# Patient Record
Sex: FEMALE | Race: WHITE
[De-identification: names, ages, dates, MRNs, and addresses within clinical notes are randomized per-mention and may not be internally consistent; named-entity substitution may affect disease eponyms.]

---

## 2017-11-14 PROBLEM — Z00.00 ENCOUNTER FOR PREVENTIVE HEALTH EXAMINATION: Status: ACTIVE | Noted: 2017-11-14

## 2018-08-15 ENCOUNTER — APPOINTMENT (OUTPATIENT)
Dept: INTERNAL MEDICINE | Facility: CLINIC | Age: 48
End: 2018-08-15
Payer: COMMERCIAL

## 2018-08-15 VITALS
BODY MASS INDEX: 40.12 KG/M2 | WEIGHT: 218 LBS | TEMPERATURE: 97.4 F | SYSTOLIC BLOOD PRESSURE: 110 MMHG | HEIGHT: 62 IN | DIASTOLIC BLOOD PRESSURE: 70 MMHG

## 2018-08-15 DIAGNOSIS — N60.01 SOLITARY CYST OF RIGHT BREAST: ICD-10-CM

## 2018-08-15 DIAGNOSIS — Z82.0 FAMILY HISTORY OF EPILEPSY AND OTHER DISEASES OF THE NERVOUS SYSTEM: ICD-10-CM

## 2018-08-15 DIAGNOSIS — Z80.0 FAMILY HISTORY OF MALIGNANT NEOPLASM OF DIGESTIVE ORGANS: ICD-10-CM

## 2018-08-15 DIAGNOSIS — Z13.89 ENCOUNTER FOR SCREENING FOR OTHER DISORDER: ICD-10-CM

## 2018-08-15 DIAGNOSIS — E66.01 MORBID (SEVERE) OBESITY DUE TO EXCESS CALORIES: ICD-10-CM

## 2018-08-15 DIAGNOSIS — Z78.9 OTHER SPECIFIED HEALTH STATUS: ICD-10-CM

## 2018-08-15 DIAGNOSIS — G47.33 OBSTRUCTIVE SLEEP APNEA (ADULT) (PEDIATRIC): ICD-10-CM

## 2018-08-15 DIAGNOSIS — E28.2 POLYCYSTIC OVARIAN SYNDROME: ICD-10-CM

## 2018-08-15 LAB
BILIRUB UR QL STRIP: NORMAL
CLARITY UR: CLEAR
COLLECTION METHOD: NORMAL
GLUCOSE UR-MCNC: NORMAL
HCG UR QL: 0.2 EU/DL
HGB UR QL STRIP.AUTO: NORMAL
KETONES UR-MCNC: NORMAL
LEUKOCYTE ESTERASE UR QL STRIP: NORMAL
NITRITE UR QL STRIP: NORMAL
PH UR STRIP: 6
PROT UR STRIP-MCNC: NORMAL
SP GR UR STRIP: 1

## 2018-08-15 PROCEDURE — 99214 OFFICE O/P EST MOD 30 MIN: CPT | Mod: 25

## 2018-08-15 PROCEDURE — 36415 COLL VENOUS BLD VENIPUNCTURE: CPT

## 2018-08-15 PROCEDURE — 81003 URINALYSIS AUTO W/O SCOPE: CPT | Mod: QW

## 2018-08-15 RX ORDER — FLUTICASONE PROPIONATE 50 UG/1
50 SPRAY, METERED NASAL
Refills: 0 | Status: ACTIVE | COMMUNITY

## 2018-08-15 RX ORDER — BIOTIN 10 MG
TABLET ORAL
Refills: 0 | Status: ACTIVE | COMMUNITY

## 2018-08-15 RX ORDER — CINNAMON BARK 500 MG
CAPSULE ORAL
Refills: 0 | Status: ACTIVE | COMMUNITY

## 2018-08-15 NOTE — PHYSICAL EXAM
[No Acute Distress] : no acute distress [Well Nourished] : well nourished [Normal Sclera/Conjunctiva] : normal sclera/conjunctiva [Normal Outer Ear/Nose] : the outer ears and nose were normal in appearance [No Respiratory Distress] : no respiratory distress  [Clear to Auscultation] : lungs were clear to auscultation bilaterally [No Accessory Muscle Use] : no accessory muscle use [Normal Rate] : normal rate  [Regular Rhythm] : with a regular rhythm [Normal S1, S2] : normal S1 and S2 [Soft] : abdomen soft [Non Tender] : non-tender [Non-distended] : non-distended [Normal Bowel Sounds] : normal bowel sounds [Normal Affect] : the affect was normal [Normal Insight/Judgement] : insight and judgment were intact

## 2018-08-15 NOTE — REVIEW OF SYSTEMS
[Dysuria] : no dysuria [Incontinence] : no incontinence [Hematuria] : no hematuria [Frequency] : frequency [Negative] : Psychiatric

## 2018-08-15 NOTE — HISTORY OF PRESENT ILLNESS
[FreeTextEntry8] : 49 y/o female present with complaints of frequent urination. She has had these symptoms for several years. She can urinate 15 times for the day. 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 However it does affect her social life because she has to know where the bathroom is. No blood in her urine. No dysuria. She hasn't had a change in her weight. She can't say that she's drinking more than usual.

## 2018-08-21 LAB
ALBUMIN SERPL ELPH-MCNC: 4.6 G/DL
ALP BLD-CCNC: 55 U/L
ALT SERPL-CCNC: 21 U/L
ANION GAP SERPL CALC-SCNC: 12 MMOL/L
APPEARANCE: CLEAR
AST SERPL-CCNC: 19 U/L
BACTERIA: NEGATIVE
BILIRUB SERPL-MCNC: 0.5 MG/DL
BILIRUBIN URINE: NEGATIVE
BLOOD URINE: NEGATIVE
BUN SERPL-MCNC: 13 MG/DL
CALCIUM SERPL-MCNC: 9.9 MG/DL
CHLORIDE SERPL-SCNC: 100 MMOL/L
CO2 SERPL-SCNC: 26 MMOL/L
COLOR: YELLOW
CREAT SERPL-MCNC: 0.3 MG/DL
GLUCOSE QUALITATIVE U: NEGATIVE MG/DL
GLUCOSE SERPL-MCNC: 86 MG/DL
HBA1C MFR BLD HPLC: 5.5 %
HYALINE CASTS: 2 /LPF
KETONES URINE: NEGATIVE
LEUKOCYTE ESTERASE URINE: NEGATIVE
MICROSCOPIC-UA: NORMAL
NITRITE URINE: NEGATIVE
PH URINE: 6.5
POTASSIUM SERPL-SCNC: 4.4 MMOL/L
PROT SERPL-MCNC: 7.6 G/DL
PROTEIN URINE: NEGATIVE MG/DL
RED BLOOD CELLS URINE: 2 /HPF
SODIUM SERPL-SCNC: 138 MMOL/L
SPECIFIC GRAVITY URINE: 1.01
SQUAMOUS EPITHELIAL CELLS: 0 /HPF
UROBILINOGEN URINE: NEGATIVE MG/DL
WHITE BLOOD CELLS URINE: 0 /HPF

## 2018-08-22 ENCOUNTER — MESSAGE (OUTPATIENT)
Age: 48
End: 2018-08-22

## 2018-11-13 ENCOUNTER — APPOINTMENT (OUTPATIENT)
Dept: INTERNAL MEDICINE | Facility: CLINIC | Age: 48
End: 2018-11-13
Payer: COMMERCIAL

## 2018-11-13 VITALS
BODY MASS INDEX: 40.48 KG/M2 | SYSTOLIC BLOOD PRESSURE: 106 MMHG | TEMPERATURE: 97.2 F | WEIGHT: 220 LBS | DIASTOLIC BLOOD PRESSURE: 70 MMHG | HEIGHT: 62 IN

## 2018-11-13 DIAGNOSIS — J02.0 STREPTOCOCCAL PHARYNGITIS: ICD-10-CM

## 2018-11-13 LAB — S PYO AG SPEC QL IA: POSITIVE

## 2018-11-13 PROCEDURE — 99214 OFFICE O/P EST MOD 30 MIN: CPT | Mod: 25

## 2018-11-13 PROCEDURE — 87880 STREP A ASSAY W/OPTIC: CPT | Mod: QW

## 2018-11-13 NOTE — PHYSICAL EXAM
[No Acute Distress] : no acute distress [Well Nourished] : well nourished [Normal Sclera/Conjunctiva] : normal sclera/conjunctiva [PERRL] : pupils equal round and reactive to light [EOMI] : extraocular movements intact [No Respiratory Distress] : no respiratory distress  [Clear to Auscultation] : lungs were clear to auscultation bilaterally [No Accessory Muscle Use] : no accessory muscle use [Normal Rate] : normal rate  [Regular Rhythm] : with a regular rhythm [Normal S1, S2] : normal S1 and S2 [Normal Posterior Cervical Nodes] : no posterior cervical lymphadenopathy [Normal Anterior Cervical Nodes] : no anterior cervical lymphadenopathy [Normal Affect] : the affect was normal [Normal Insight/Judgement] : insight and judgment were intact [de-identified] : BL TM bulging   oropharynx erythema

## 2018-11-14 RX ORDER — NICOTINE 21 MG/24HR
14 PATCH, TRANSDERMAL 24 HOURS TRANSDERMAL DAILY
Qty: 30 | Refills: 2 | Status: ACTIVE | COMMUNITY
Start: 2018-11-13 | End: 1900-01-01

## 2018-12-04 ENCOUNTER — CLINICAL ADVICE (OUTPATIENT)
Age: 48
End: 2018-12-04

## 2019-04-04 ENCOUNTER — APPOINTMENT (OUTPATIENT)
Dept: INTERNAL MEDICINE | Facility: CLINIC | Age: 49
End: 2019-04-04
Payer: COMMERCIAL

## 2019-04-04 VITALS
BODY MASS INDEX: 41.41 KG/M2 | DIASTOLIC BLOOD PRESSURE: 70 MMHG | HEIGHT: 62 IN | WEIGHT: 225 LBS | TEMPERATURE: 97.9 F | SYSTOLIC BLOOD PRESSURE: 100 MMHG

## 2019-04-04 PROCEDURE — 99214 OFFICE O/P EST MOD 30 MIN: CPT

## 2019-04-04 NOTE — PHYSICAL EXAM
[No Acute Distress] : no acute distress [Well Nourished] : well nourished [Normal Sclera/Conjunctiva] : normal sclera/conjunctiva [PERRL] : pupils equal round and reactive to light [EOMI] : extraocular movements intact [Normal Outer Ear/Nose] : the outer ears and nose were normal in appearance [No JVD] : no jugular venous distention [Supple] : supple [No Lymphadenopathy] : no lymphadenopathy [No Respiratory Distress] : no respiratory distress  [Clear to Auscultation] : lungs were clear to auscultation bilaterally [No Accessory Muscle Use] : no accessory muscle use [Normal Rate] : normal rate  [Regular Rhythm] : with a regular rhythm [Normal S1, S2] : normal S1 and S2 [Normal Posterior Cervical Nodes] : no posterior cervical lymphadenopathy [Normal Anterior Cervical Nodes] : no anterior cervical lymphadenopathy [Normal Affect] : the affect was normal [Normal Insight/Judgement] : insight and judgment were intact [de-identified] : PND present.  Mild nasal mucosa erythema

## 2019-04-04 NOTE — HISTORY OF PRESENT ILLNESS
[FreeTextEntry8] : 47 y/o female present with a sinus infection. \par She had facial pain and pressure over the weekend\par Her Chiropractor gave her Medrol dose pack for her back pain and sinus congestion and she feels much better.  Her only complaitn now is mild congestion, PND and slight cough

## 2019-04-04 NOTE — PLAN
[FreeTextEntry1] : pt will start medications above and follow up in the next 3-4 days if not improvement or before then if worsens

## 2019-04-26 ENCOUNTER — APPOINTMENT (OUTPATIENT)
Dept: INTERNAL MEDICINE | Facility: CLINIC | Age: 49
End: 2019-04-26
Payer: COMMERCIAL

## 2019-04-26 VITALS
BODY MASS INDEX: 41.41 KG/M2 | HEIGHT: 62 IN | SYSTOLIC BLOOD PRESSURE: 110 MMHG | WEIGHT: 225 LBS | TEMPERATURE: 97 F | DIASTOLIC BLOOD PRESSURE: 80 MMHG

## 2019-04-26 DIAGNOSIS — Z87.09 PERSONAL HISTORY OF OTHER DISEASES OF THE RESPIRATORY SYSTEM: ICD-10-CM

## 2019-04-26 DIAGNOSIS — Z87.891 PERSONAL HISTORY OF NICOTINE DEPENDENCE: ICD-10-CM

## 2019-04-26 DIAGNOSIS — F17.200 NICOTINE DEPENDENCE, UNSPECIFIED, UNCOMPLICATED: ICD-10-CM

## 2019-04-26 DIAGNOSIS — Z86.69 PERSONAL HISTORY OF OTHER DISEASES OF THE NERVOUS SYSTEM AND SENSE ORGANS: ICD-10-CM

## 2019-04-26 LAB — S PYO AG SPEC QL IA: NEGATIVE

## 2019-04-26 PROCEDURE — 87880 STREP A ASSAY W/OPTIC: CPT | Mod: QW

## 2019-04-26 PROCEDURE — 99214 OFFICE O/P EST MOD 30 MIN: CPT | Mod: 25

## 2019-04-26 RX ORDER — METHYLPREDNISOLONE 4 MG/1
4 TABLET ORAL
Qty: 21 | Refills: 0 | Status: COMPLETED | COMMUNITY
Start: 2019-03-25

## 2019-04-26 RX ORDER — BROMPHENIRAMINE MALEATE, PSEUDOEPHEDRINE HYDROCHLORIDE AND DEXTROMETHORPHAN HYDROBROMIDE 2; 30; 10 MG/5ML; MG/5ML; MG/5ML
30-2-10 SYRUP ORAL
Qty: 200 | Refills: 0 | Status: COMPLETED | COMMUNITY
Start: 2019-04-04 | End: 2019-04-26

## 2019-04-26 RX ORDER — CEFDINIR 300 MG/1
300 CAPSULE ORAL
Qty: 20 | Refills: 0 | Status: COMPLETED | COMMUNITY
Start: 2018-11-13 | End: 2019-04-26

## 2019-04-26 RX ORDER — PREDNISONE 50 MG/1
50 TABLET ORAL
Qty: 5 | Refills: 0 | Status: COMPLETED | COMMUNITY
Start: 2019-03-25

## 2019-04-26 RX ORDER — PROMETHAZINE HYDROCHLORIDE AND CODEINE PHOSPHATE 6.25; 1 MG/5ML; MG/5ML
6.25-1 SOLUTION ORAL
Qty: 100 | Refills: 0 | Status: COMPLETED | COMMUNITY
Start: 2018-11-13 | End: 2019-04-26

## 2019-04-26 RX ORDER — METFORMIN HYDROCHLORIDE 500 MG/1
500 TABLET, COATED ORAL
Refills: 0 | Status: COMPLETED | COMMUNITY
End: 2019-04-26

## 2019-04-26 RX ORDER — PHENTERMINE AND TOPIRAMATE 7.5; 46 MG/1; MG/1
7.5-46 CAPSULE, EXTENDED RELEASE ORAL
Qty: 30 | Refills: 0 | Status: COMPLETED | COMMUNITY
Start: 2018-12-05

## 2019-04-26 RX ORDER — METFORMIN ER 500 MG 500 MG/1
500 TABLET ORAL
Qty: 180 | Refills: 0 | Status: ACTIVE | COMMUNITY
Start: 2019-04-04

## 2019-04-26 NOTE — REVIEW OF SYSTEMS
[Nasal Discharge] : nasal discharge [Earache] : earache [Sore Throat] : sore throat [Cough] : cough [Negative] : Cardiovascular

## 2019-04-26 NOTE — HISTORY OF PRESENT ILLNESS
[FreeTextEntry8] : 47 y/o female present with a sinus pressure, sore throat and right ear pressure that has been occurring for the last 2 weeks now getting worse.\par Thought it might have been allergies but now getting pain no improvement with cough medication and nasal sprays

## 2019-04-26 NOTE — PHYSICAL EXAM
[No Acute Distress] : no acute distress [Normal Sclera/Conjunctiva] : normal sclera/conjunctiva [PERRL] : pupils equal round and reactive to light [Well Nourished] : well nourished [Normal Outer Ear/Nose] : the outer ears and nose were normal in appearance [EOMI] : extraocular movements intact [No JVD] : no jugular venous distention [Supple] : supple [No Lymphadenopathy] : no lymphadenopathy [No Respiratory Distress] : no respiratory distress  [Clear to Auscultation] : lungs were clear to auscultation bilaterally [Normal Rate] : normal rate  [Regular Rhythm] : with a regular rhythm [No Accessory Muscle Use] : no accessory muscle use [Normal S1, S2] : normal S1 and S2 [Normal Posterior Cervical Nodes] : no posterior cervical lymphadenopathy [Normal Anterior Cervical Nodes] : no anterior cervical lymphadenopathy [Normal Affect] : the affect was normal [Normal Insight/Judgement] : insight and judgment were intact [de-identified] : Mild oropharynx erythema.  TM intact BL right TM erythema, nasal mucosa erythema and swelling

## 2019-04-26 NOTE — PLAN
[FreeTextEntry1] : Pt will start medications above and follow up in 5-7 days if not feeling better or before then if worsening symptoms\par

## 2019-05-07 ENCOUNTER — APPOINTMENT (OUTPATIENT)
Dept: INTERNAL MEDICINE | Facility: CLINIC | Age: 49
End: 2019-05-07
Payer: COMMERCIAL

## 2019-05-07 VITALS
HEIGHT: 62 IN | DIASTOLIC BLOOD PRESSURE: 80 MMHG | TEMPERATURE: 97.7 F | BODY MASS INDEX: 41.41 KG/M2 | SYSTOLIC BLOOD PRESSURE: 120 MMHG | WEIGHT: 225 LBS

## 2019-05-07 DIAGNOSIS — Z86.19 PERSONAL HISTORY OF OTHER INFECTIOUS AND PARASITIC DISEASES: ICD-10-CM

## 2019-05-07 LAB
BILIRUB UR QL STRIP: NEGATIVE
CLARITY UR: CLEAR
COLLECTION METHOD: NORMAL
GLUCOSE UR-MCNC: NEGATIVE
HCG UR QL: NORMAL EU/DL
HGB UR QL STRIP.AUTO: NEGATIVE
KETONES UR-MCNC: NEGATIVE
LEUKOCYTE ESTERASE UR QL STRIP: NORMAL
NITRITE UR QL STRIP: NEGATIVE
PH UR STRIP: 7
PROT UR STRIP-MCNC: NEGATIVE
SP GR UR STRIP: 1.02

## 2019-05-07 PROCEDURE — 36415 COLL VENOUS BLD VENIPUNCTURE: CPT

## 2019-05-07 PROCEDURE — 81003 URINALYSIS AUTO W/O SCOPE: CPT | Mod: QW

## 2019-05-07 PROCEDURE — 99214 OFFICE O/P EST MOD 30 MIN: CPT | Mod: 25

## 2019-05-07 NOTE — PHYSICAL EXAM
[Normal Sclera/Conjunctiva] : normal sclera/conjunctiva [No Acute Distress] : no acute distress [EOMI] : extraocular movements intact [PERRL] : pupils equal round and reactive to light [Normal Outer Ear/Nose] : the outer ears and nose were normal in appearance [Normal TMs] : both tympanic membranes were normal [Normal Oropharynx] : the oropharynx was normal [Supple] : supple [No JVD] : no jugular venous distention [Clear to Auscultation] : lungs were clear to auscultation bilaterally [No Lymphadenopathy] : no lymphadenopathy [No Respiratory Distress] : no respiratory distress  [Normal Rate] : normal rate  [No Accessory Muscle Use] : no accessory muscle use [Regular Rhythm] : with a regular rhythm [Normal S1, S2] : normal S1 and S2 [Normal Affect] : the affect was normal [de-identified] : TMJ tenderness with Palpation BL  [Normal Insight/Judgement] : insight and judgment were intact

## 2019-05-07 NOTE — PLAN
[FreeTextEntry1] : Pt appears to have secondary vaginal candidiasis from abx use  she will take Diflucan above for single dose.  urine negative for UTI and no flank pain, fever or urinary symptoms\par She will start Zanaflex for TMJ at bedtime and follow up with specialist as planned\par She will start Protonix for her nausea and reflux for the next 7 to 14 days pending on how she is feeling\par She needs no further abx treatment as her OM has resolved. \par Follow up in 1 week if not feeling better or before then if worsens\par BW will be done as well today for further evaluation

## 2019-05-07 NOTE — HISTORY OF PRESENT ILLNESS
[FreeTextEntry8] : 47 y/o female present with an ear infection, bathroom problems, and yeast infection. \par Pt presents to the office today for follow up.  She has been getting nausea and bloating since taking Augmentin.   She only completed 7 days of treatment and had to stop medication.\par She has been getting vaginal itch and D/C no flank pain no dysuria\par Ear pain has subsided but has been with exacerbation of her TMJ and is currently waiting on appointment with specialist.

## 2019-05-07 NOTE — REVIEW OF SYSTEMS
[Fatigue] : fatigue [Abdominal Pain] : no abdominal pain [Nausea] : nausea [Constipation] : no constipation [Diarrhea] : diarrhea [Vomiting] : no vomiting [Heartburn] : heartburn [Nocturia] : no nocturia [Dysuria] : no dysuria [Incontinence] : no incontinence [Hematuria] : no hematuria [Frequency] : no frequency [Vaginal Discharge] : vaginal discharge [Negative] : Respiratory [FreeTextEntry7] : Bloating  [FreeTextEntry4] : ear pressure TMJ tenderness

## 2019-05-09 LAB
ALBUMIN SERPL ELPH-MCNC: 4.5 G/DL
ALP BLD-CCNC: 54 U/L
ALT SERPL-CCNC: 23 U/L
ANION GAP SERPL CALC-SCNC: 16 MMOL/L
AST SERPL-CCNC: 18 U/L
BASOPHILS # BLD AUTO: 0.04 K/UL
BASOPHILS NFR BLD AUTO: 0.4 %
BILIRUB SERPL-MCNC: 0.4 MG/DL
BUN SERPL-MCNC: 13 MG/DL
CALCIUM SERPL-MCNC: 10 MG/DL
CHLORIDE SERPL-SCNC: 102 MMOL/L
CO2 SERPL-SCNC: 24 MMOL/L
CREAT SERPL-MCNC: 0.76 MG/DL
EOSINOPHIL # BLD AUTO: 0.08 K/UL
EOSINOPHIL NFR BLD AUTO: 0.9 %
GLUCOSE SERPL-MCNC: 94 MG/DL
HCT VFR BLD CALC: 41.7 %
HGB BLD-MCNC: 13.5 G/DL
IMM GRANULOCYTES NFR BLD AUTO: 0.4 %
LYMPHOCYTES # BLD AUTO: 2.84 K/UL
LYMPHOCYTES NFR BLD AUTO: 30.8 %
MAN DIFF?: NORMAL
MCHC RBC-ENTMCNC: 31.3 PG
MCHC RBC-ENTMCNC: 32.4 GM/DL
MCV RBC AUTO: 96.8 FL
MONOCYTES # BLD AUTO: 0.59 K/UL
MONOCYTES NFR BLD AUTO: 6.4 %
NEUTROPHILS # BLD AUTO: 5.63 K/UL
NEUTROPHILS NFR BLD AUTO: 61.1 %
PLATELET # BLD AUTO: 348 K/UL
POTASSIUM SERPL-SCNC: 4.1 MMOL/L
PROT SERPL-MCNC: 7 G/DL
RBC # BLD: 4.31 M/UL
RBC # FLD: 12.5 %
SODIUM SERPL-SCNC: 142 MMOL/L
WBC # FLD AUTO: 9.22 K/UL

## 2019-05-10 ENCOUNTER — RESULT REVIEW (OUTPATIENT)
Age: 49
End: 2019-05-10

## 2020-12-01 ENCOUNTER — APPOINTMENT (OUTPATIENT)
Dept: INTERNAL MEDICINE | Facility: CLINIC | Age: 50
End: 2020-12-01
Payer: COMMERCIAL

## 2020-12-01 VITALS
HEART RATE: 82 BPM | TEMPERATURE: 98.8 F | OXYGEN SATURATION: 99 % | DIASTOLIC BLOOD PRESSURE: 82 MMHG | RESPIRATION RATE: 16 BRPM | SYSTOLIC BLOOD PRESSURE: 122 MMHG

## 2020-12-01 DIAGNOSIS — R22.41 LOCALIZED SWELLING, MASS AND LUMP, RIGHT LOWER LIMB: ICD-10-CM

## 2020-12-01 PROCEDURE — 99214 OFFICE O/P EST MOD 30 MIN: CPT

## 2020-12-01 PROCEDURE — 99072 ADDL SUPL MATRL&STAF TM PHE: CPT

## 2020-12-01 RX ORDER — AMOXICILLIN AND CLAVULANATE POTASSIUM 875; 125 MG/1; MG/1
875-125 TABLET, COATED ORAL
Qty: 20 | Refills: 0 | Status: COMPLETED | COMMUNITY
Start: 2019-04-26 | End: 2020-12-01

## 2020-12-01 NOTE — REVIEW OF SYSTEMS
[FreeTextEntry7] : Loose stool the last few days [FreeTextEntry9] : Mild right calf tenderness lateral aspect no masses.  Lump behind right knee

## 2020-12-01 NOTE — PHYSICAL EXAM
[de-identified] : Varicose veins present.  No LE Edema BL    negative Homans' sign.  Right lateral calf is with palpation no masses [de-identified] : Right posterior knee small boggy mass palpable crease warmth

## 2020-12-01 NOTE — PLAN
[FreeTextEntry1] : Patient has mild intermittent loose stool for the last couple of days patient contributes eating leftovers for Thanksgiving\par Patient has no other Covid-like symptoms and refused Covid nasal swab\par Patient will go to radiology for x-ray of right knee and venous Doppler of right lower extremity for further evaluation

## 2020-12-01 NOTE — HISTORY OF PRESENT ILLNESS
[FreeTextEntry8] : Patient presents the office today with right posterior knee pain and lump for the last week.  She also states that she has mild calf pain.  She has had loose stool for the last few days.  Patient denies cough shortness of breath chest congestion no fevers

## 2020-12-02 ENCOUNTER — NON-APPOINTMENT (OUTPATIENT)
Age: 50
End: 2020-12-02

## 2020-12-08 ENCOUNTER — NON-APPOINTMENT (OUTPATIENT)
Age: 50
End: 2020-12-08

## 2020-12-09 ENCOUNTER — NON-APPOINTMENT (OUTPATIENT)
Age: 50
End: 2020-12-09

## 2020-12-16 PROBLEM — J02.0 PHARYNGITIS DUE TO GROUP A BETA HEMOLYTIC STREPTOCOCCI: Status: RESOLVED | Noted: 2018-11-13 | Resolved: 2020-12-16

## 2020-12-21 PROBLEM — Z86.69 HISTORY OF ACUTE OTITIS MEDIA: Status: RESOLVED | Noted: 2019-04-26 | Resolved: 2020-12-21

## 2020-12-21 PROBLEM — Z86.19 HISTORY OF CANDIDIASIS OF VAGINA: Status: RESOLVED | Noted: 2019-05-07 | Resolved: 2020-12-21

## 2020-12-21 PROBLEM — Z87.09 HISTORY OF SORE THROAT: Status: RESOLVED | Noted: 2019-04-26 | Resolved: 2020-12-21

## 2021-04-08 ENCOUNTER — APPOINTMENT (OUTPATIENT)
Dept: INTERNAL MEDICINE | Facility: CLINIC | Age: 51
End: 2021-04-08
Payer: COMMERCIAL

## 2021-04-08 VITALS
SYSTOLIC BLOOD PRESSURE: 130 MMHG | HEIGHT: 62 IN | TEMPERATURE: 96.9 F | WEIGHT: 220 LBS | DIASTOLIC BLOOD PRESSURE: 90 MMHG | BODY MASS INDEX: 40.48 KG/M2

## 2021-04-08 PROCEDURE — 99072 ADDL SUPL MATRL&STAF TM PHE: CPT

## 2021-04-08 PROCEDURE — 99214 OFFICE O/P EST MOD 30 MIN: CPT

## 2021-04-08 NOTE — HISTORY OF PRESENT ILLNESS
[de-identified] : Patient presents the office today for follow-up from her orthopedic doctor\par Patient went for MRI of her lumbar spine but has not reviewed the results with Dr. Pitts yet.  She has been suffering with right knee pain and lower back pain has been going to PT she has taken cyclobenzaprine and recently finished Medrol Dosepak.  Pain is still radiating down her right leg

## 2021-04-08 NOTE — PLAN
[FreeTextEntry1] : As above she needs to follow-up with orthopedic for review of MRI results\par However she should be making an appointment with pain management for possible epidural injection

## 2021-04-08 NOTE — REVIEW OF SYSTEMS
[Negative] : Constitutional [FreeTextEntry9] : Lower back pain rating down the right leg and right knee pain

## 2021-04-22 ENCOUNTER — TRANSCRIPTION ENCOUNTER (OUTPATIENT)
Age: 51
End: 2021-04-22

## 2021-04-22 ENCOUNTER — APPOINTMENT (OUTPATIENT)
Dept: DISASTER EMERGENCY | Facility: CLINIC | Age: 51
End: 2021-04-22

## 2021-04-23 LAB — SARS-COV-2 N GENE NPH QL NAA+PROBE: NOT DETECTED

## 2021-07-20 ENCOUNTER — APPOINTMENT (OUTPATIENT)
Dept: INTERNAL MEDICINE | Facility: CLINIC | Age: 51
End: 2021-07-20

## 2021-08-20 ENCOUNTER — TRANSCRIPTION ENCOUNTER (OUTPATIENT)
Age: 51
End: 2021-08-20

## 2022-04-27 ENCOUNTER — APPOINTMENT (OUTPATIENT)
Dept: PAIN MANAGEMENT | Facility: CLINIC | Age: 52
End: 2022-04-27

## 2022-05-03 ENCOUNTER — APPOINTMENT (OUTPATIENT)
Dept: PAIN MANAGEMENT | Facility: CLINIC | Age: 52
End: 2022-05-03
Payer: COMMERCIAL

## 2022-05-03 VITALS — HEIGHT: 62 IN | BODY MASS INDEX: 40.48 KG/M2 | WEIGHT: 220 LBS

## 2022-05-03 PROCEDURE — 99214 OFFICE O/P EST MOD 30 MIN: CPT

## 2022-05-03 RX ORDER — PREGABALIN 100 MG/1
100 CAPSULE ORAL
Refills: 0 | Status: ACTIVE | COMMUNITY

## 2022-05-03 RX ORDER — CYCLOBENZAPRINE HYDROCHLORIDE 5 MG/1
5 TABLET, FILM COATED ORAL
Refills: 0 | Status: ACTIVE | COMMUNITY

## 2022-05-03 RX ORDER — PREDNISONE 10 MG/1
10 TABLET ORAL
Refills: 0 | Status: ACTIVE | COMMUNITY

## 2022-05-03 RX ORDER — DICLOFENAC SODIUM 75 MG/1
75 TABLET, DELAYED RELEASE ORAL
Refills: 0 | Status: ACTIVE | COMMUNITY

## 2022-05-03 RX ORDER — HYDROCODONE BITARTRATE AND ACETAMINOPHEN 5; 300 MG/1; MG/1
5-300 TABLET ORAL
Refills: 0 | Status: ACTIVE | COMMUNITY

## 2022-05-03 RX ORDER — IBUPROFEN 800 MG/1
800 TABLET, FILM COATED ORAL
Refills: 0 | Status: ACTIVE | COMMUNITY

## 2022-05-03 RX ORDER — METFORMIN HYDROCHLORIDE 500 MG/1
500 TABLET, COATED ORAL
Refills: 0 | Status: ACTIVE | COMMUNITY

## 2022-05-03 RX ORDER — PREGABALIN 50 MG/1
50 CAPSULE ORAL
Refills: 0 | Status: ACTIVE | COMMUNITY

## 2022-05-03 NOTE — ASSESSMENT
[FreeTextEntry1] : After discussing various treatment options with the patient including but not limited to oral medications, physical therapy, exercise, modalities as well as interventional spinal injections, we have decided with the following plan:\par \par I personally reviewed the MRI/CT scan images and agree with the radiologist's report. The radiological findings were discussed with the patient.\par \par The risks, benefits, contents and alternatives to injection were explained in full to the patient. Risks outlined include but are not limited to infection,sepsis, bleeding, post-dural puncture headache, nerve damage, temporary increase in pain, syncopal episode, failure to resolve symptoms, allergic reaction, symptom recurrence, and elevation of blood sugar in diabetics. Cortisone may cause immunosuppression. Patient understands the risks. All questions were answered. After discussion of options, patient requested an injection. Information regarding the injection was given to the patient. Which medications to stop prior to the injection was explained to the patient as well.\par \par Follow up in 1-2 weeks post injection for re-evaluation. \par \par Continue Home exercises, stretching, activity modification, physical therapy, and conservative care.\par \par Patient is presenting with acute/sub-acute radicular pain with impairment in ADLs and functionality. The pain has not responded to conservative care including nsaid therapy and/or physical therapy. There is no bleeding tendency, unstable medical condition, or systemic infection.\par \par \par right L4/5 L5/S1 TFESI\par \par refill Lyrica I would recommend  continue of neuropathic medication as patient presents with signs of nerve irritation. (ie burning, paresthesias etc) Goals of therapy would be to improve pain and overall QOL. Side effects reviewed with patient. Patient will call or stop medication if given side effects occur.\par \par

## 2022-05-03 NOTE — HISTORY OF PRESENT ILLNESS
[Lower back] : lower back [1] : 2 [6] : 6 [Dull/Aching] : dull/aching [Throbbing] : throbbing [] : yes [Constant] : constant [Meds] : meds [Walking/activity] : walking/activity [Standing] : standing [FreeTextEntry1] : 05/03/2022: follow up today. taking Lyrica 100mg BID. She reports its helping, however she is gaining wait.  she was qsyemia previously, which she should look back into as it does contain Topamax ER.  pain in the hamstrings. \par \par 2/21/22: follow up today after right L4/5 TFESI on 1/31/22. she reports pain relief following. reports some weight gain relief the lyrica. Feels better when she works. Pain over the right lower back. she is taking 100mg BID\par \par 12/22/21- patient here for follow up. pain is returning in right leg and into right foot. Feels well on Lyrica. add 50mg daily. \par \par 10/19/21: follow up today. she reports overall she is doing well with the medication. She reports with prolonged lying down or sitting she gets pain down the right leg. Ibuprofen is helping. she forgot to take lyrica one night and felt worse. \par \par 8/30/21: follow up today. since last visit she did not start Cymbalta, instead Lyrica was called in at the patients request. She started taking 50mg BID and it helped, pain increased and she started taking it TID. \par \par 7/14/21: follow up today after right L4/5 TFESI on 6/17/21. Had 75% relief from MICHAEL. Will start Cymbalta. \par \par 6/15/21: follow up today after right L4/5 TESI on 5/20/21. Pain starts in the right buttock and radiates to the right calf and ankle. (very tearful today) she went for a follow up for her right knee (Dr. Pitts). She went to [facility] and was told it was coming from her back. Saw Dr. Levin and was told she needed surgery. \par \par 5/12/21- Patient had 60% relief from TFESI. Will repeat for cumulative effect.\par \par 4/14/21: Patient presents for initial evaluation. She complains of back and right leg pain. she started with right knee pain in January and had visco injections. she started PT and on the third session her pain started in the back and down the right leg. Pain radiates to the right lateral leg. Pain in the right calf. Intermittent n/t. tough to comfortable. +weakness. \par \par Subjective weakness: Yes\par Lower extremity paresthesias: Yes\par Bladder/bowel dysfunction: No\par \par Attempted modalities for current complaint:\par See above:\par \par Medications: Yes\par NSAIDS PRN\par \par Injections: (4/27/21, 5/20/21, 6/17/21, 1/31/22) - right L4-L5 TFESI\par \par Previous Spine Surgery: N/A\par \par Imaging:\par MRI Lumbar Spine (4/3/21) - Impression:\par 1. Slight exaggerated lumbar lordosis with anterolisthesis at L4-L5 where there is encroachment upon the right exiting L4 nerve root and moderate right foraminal narrowing with a small right paracentral protrusion encroaching upon the right S1 nerve root at L5-S1.\par 2. No acute fracture or pars defect. [FreeTextEntry6] : pressure [FreeTextEntry7] : right leg

## 2022-05-03 NOTE — PHYSICAL EXAM
[] : no palpable masses [TWNoteComboBox7] : forward flexion 75 degrees [de-identified] : extension 20 degrees

## 2022-06-21 ENCOUNTER — TRANSCRIPTION ENCOUNTER (OUTPATIENT)
Age: 52
End: 2022-06-21

## 2022-06-29 ENCOUNTER — APPOINTMENT (OUTPATIENT)
Dept: PAIN MANAGEMENT | Facility: CLINIC | Age: 52
End: 2022-06-29

## 2022-07-11 ENCOUNTER — NON-APPOINTMENT (OUTPATIENT)
Age: 52
End: 2022-07-11

## 2022-07-13 ENCOUNTER — APPOINTMENT (OUTPATIENT)
Dept: PAIN MANAGEMENT | Facility: CLINIC | Age: 52
End: 2022-07-13

## 2022-08-24 ENCOUNTER — APPOINTMENT (OUTPATIENT)
Dept: PAIN MANAGEMENT | Facility: CLINIC | Age: 52
End: 2022-08-24

## 2022-08-24 VITALS — BODY MASS INDEX: 41.59 KG/M2 | WEIGHT: 226 LBS | HEIGHT: 62 IN

## 2022-08-24 PROCEDURE — 99213 OFFICE O/P EST LOW 20 MIN: CPT

## 2022-08-24 NOTE — PHYSICAL EXAM
[] : no palpable masses [TWNoteComboBox7] : forward flexion 75 degrees [de-identified] : extension 20 degrees

## 2022-08-24 NOTE — HISTORY OF PRESENT ILLNESS
[Lower back] : lower back [1] : 2 [6] : 6 [Dull/Aching] : dull/aching [Throbbing] : throbbing [] : yes [Constant] : constant [Meds] : meds [Walking/activity] : walking/activity [Standing] : standing [FreeTextEntry1] : 08/24/2022: follow up today.  Will schedule repeat TFESI.  Feels well on Lyrica.  \par \par 05/03/2022: follow up today. taking Lyrica 100mg BID. She reports its helping, however she is gaining wait.  she was qsyemia previously, which she should look back into as it does contain Topamax ER.  pain in the hamstrings. \par \par 2/21/22: follow up today after right L4/5 TFESI on 1/31/22. she reports pain relief following. reports some weight gain relief the lyrica. Feels better when she works. Pain over the right lower back. she is taking 100mg BID\par \par 12/22/21- patient here for follow up. pain is returning in right leg and into right foot. Feels well on Lyrica. add 50mg daily. \par \par 10/19/21: follow up today. she reports overall she is doing well with the medication. She reports with prolonged lying down or sitting she gets pain down the right leg. Ibuprofen is helping. she forgot to take lyrica one night and felt worse. \par \par 8/30/21: follow up today. since last visit she did not start Cymbalta, instead Lyrica was called in at the patients request. She started taking 50mg BID and it helped, pain increased and she started taking it TID. \par \par 7/14/21: follow up today after right L4/5 TFESI on 6/17/21. Had 75% relief from MICHAEL. Will start Cymbalta. \par \par 6/15/21: follow up today after right L4/5 TESI on 5/20/21. Pain starts in the right buttock and radiates to the right calf and ankle. (very tearful today) she went for a follow up for her right knee (Dr. Pitts). She went to [facility] and was told it was coming from her back. Saw Dr. Levin and was told she needed surgery. \par \par 5/12/21- Patient had 60% relief from TFESI. Will repeat for cumulative effect.\par \par 4/14/21: Patient presents for initial evaluation. She complains of back and right leg pain. she started with right knee pain in January and had visco injections. she started PT and on the third session her pain started in the back and down the right leg. Pain radiates to the right lateral leg. Pain in the right calf. Intermittent n/t. tough to comfortable. +weakness. \par \par Subjective weakness: Yes\par Lower extremity paresthesias: Yes\par Bladder/bowel dysfunction: No\par \par Attempted modalities for current complaint:\par See above:\par \par Medications: Yes\par NSAIDS PRN\par \par Injections: (4/27/21, 5/20/21, 6/17/21, 1/31/22) - right L4-L5 TFESI\par \par Previous Spine Surgery: N/A\par \par Imaging:\par MRI Lumbar Spine (4/3/21) - Impression:\par 1. Slight exaggerated lumbar lordosis with anterolisthesis at L4-L5 where there is encroachment upon the right exiting L4 nerve root and moderate right foraminal narrowing with a small right paracentral protrusion encroaching upon the right S1 nerve root at L5-S1.\par 2. No acute fracture or pars defect. [FreeTextEntry6] : pressure [FreeTextEntry7] : right leg

## 2022-09-22 ENCOUNTER — APPOINTMENT (OUTPATIENT)
Age: 52
End: 2022-09-22

## 2022-11-10 ENCOUNTER — APPOINTMENT (OUTPATIENT)
Age: 52
End: 2022-11-10

## 2022-11-30 ENCOUNTER — APPOINTMENT (OUTPATIENT)
Dept: PAIN MANAGEMENT | Facility: CLINIC | Age: 52
End: 2022-11-30

## 2023-01-30 ENCOUNTER — APPOINTMENT (OUTPATIENT)
Dept: INTERNAL MEDICINE | Facility: CLINIC | Age: 53
End: 2023-01-30
Payer: COMMERCIAL

## 2023-01-30 VITALS
WEIGHT: 220 LBS | TEMPERATURE: 99.2 F | SYSTOLIC BLOOD PRESSURE: 118 MMHG | DIASTOLIC BLOOD PRESSURE: 80 MMHG | HEART RATE: 70 BPM | OXYGEN SATURATION: 98 % | HEIGHT: 62 IN | BODY MASS INDEX: 40.48 KG/M2

## 2023-01-30 DIAGNOSIS — J02.9 ACUTE PHARYNGITIS, UNSPECIFIED: ICD-10-CM

## 2023-01-30 LAB — S PYO AG SPEC QL IA: NEGATIVE

## 2023-01-30 PROCEDURE — 99213 OFFICE O/P EST LOW 20 MIN: CPT | Mod: 25

## 2023-01-30 PROCEDURE — 87880 STREP A ASSAY W/OPTIC: CPT | Mod: QW

## 2023-01-30 NOTE — HISTORY OF PRESENT ILLNESS
[FreeTextEntry8] : 53 y/o female presents to the office today with swollen glands, sore throat and congestion \par Pt states she found Amoxicillin in her house and has been taking it \par No fevers no chills no body aches

## 2023-01-30 NOTE — PLAN
[FreeTextEntry1] : Rapid strep test negative\par Viral PCR done in office today as patient has checked her self for COVID daily since Saturday and has been negative\par Patient is already started herself on amoxicillin however do not recommend patient using as this appears to be viral infection at this time\par Patient will be advised of PCR results\par Patient will call with any new or worsening symptoms\par Recommend over-the-counter nasal decongestant and Flonase

## 2023-01-30 NOTE — PHYSICAL EXAM
[Normal Outer Ear/Nose] : the outer ears and nose were normal in appearance [Normal Oropharynx] : the oropharynx was normal [Normal TMs] : both tympanic membranes were normal [Normal] : affect was normal and insight and judgment were intact [de-identified] : Mild nasal mucosa erythema

## 2023-02-02 ENCOUNTER — TRANSCRIPTION ENCOUNTER (OUTPATIENT)
Age: 53
End: 2023-02-02

## 2023-02-02 LAB
RAPID RVP RESULT: DETECTED
RV+EV RNA SPEC QL NAA+PROBE: DETECTED
SARS-COV-2 RNA PNL RESP NAA+PROBE: NOT DETECTED

## 2023-08-03 ENCOUNTER — APPOINTMENT (OUTPATIENT)
Dept: INTERNAL MEDICINE | Facility: CLINIC | Age: 53
End: 2023-08-03

## 2023-09-15 ENCOUNTER — APPOINTMENT (OUTPATIENT)
Dept: PAIN MANAGEMENT | Facility: CLINIC | Age: 53
End: 2023-09-15
Payer: COMMERCIAL

## 2023-09-15 VITALS — WEIGHT: 221 LBS | HEIGHT: 62 IN | BODY MASS INDEX: 40.67 KG/M2

## 2023-09-15 PROCEDURE — 99213 OFFICE O/P EST LOW 20 MIN: CPT

## 2023-09-27 ENCOUNTER — APPOINTMENT (OUTPATIENT)
Dept: PAIN MANAGEMENT | Facility: CLINIC | Age: 53
End: 2023-09-27

## 2023-10-03 ENCOUNTER — APPOINTMENT (OUTPATIENT)
Dept: PAIN MANAGEMENT | Facility: CLINIC | Age: 53
End: 2023-10-03

## 2023-12-12 ENCOUNTER — APPOINTMENT (OUTPATIENT)
Dept: PAIN MANAGEMENT | Facility: CLINIC | Age: 53
End: 2023-12-12

## 2024-01-03 ENCOUNTER — APPOINTMENT (OUTPATIENT)
Dept: PAIN MANAGEMENT | Facility: CLINIC | Age: 54
End: 2024-01-03
Payer: COMMERCIAL

## 2024-01-03 VITALS — HEIGHT: 62 IN | WEIGHT: 212 LBS | BODY MASS INDEX: 39.01 KG/M2

## 2024-01-03 DIAGNOSIS — M51.9 UNSPECIFIED THORACIC, THORACOLUMBAR AND LUMBOSACRAL INTERVERTEBRAL DISC DISORDER: ICD-10-CM

## 2024-01-03 PROCEDURE — 99213 OFFICE O/P EST LOW 20 MIN: CPT

## 2024-01-03 RX ORDER — PREGABALIN 50 MG/1
50 CAPSULE ORAL
Qty: 60 | Refills: 2 | Status: ACTIVE | COMMUNITY
Start: 2022-05-03 | End: 1900-01-01

## 2024-01-03 NOTE — ASSESSMENT
[FreeTextEntry1] : After discussing various treatment options with the patient including but not limited to oral medications, physical therapy, exercise, modalities as well as interventional spinal injections, we have decided with the following plan:  1) Intervention Injection Therapy: I personally reviewed the MRI/CT scan images and agree with the radiologist's report. The radiological findings were discussed with the patient. The risks, benefits, contents and alternatives to injection were explained in full to the patient. Risks outlined include but are not limited to infection,sepsis, bleeding, post-dural puncture headache, nerve damage, temporary increase in pain, syncopal episode, failure to resolve symptoms, allergic reaction, symptom recurrence, and elevation of blood sugar in diabetics. Cortisone may cause immunosuppression. Patient understands the risks. All questions were answered. After discussion of options, patient requested an injection. Information regarding the injection was given to the patient. Which medications to stop prior to the injection was explained to the patient as well.  Follow up in 1-2 weeks post injection for re-evaluation.  Continue Home exercises, stretching, activity modification, physical therapy, and conservative care.  Patient is presenting with acute/sub-acute radicular pain with impairment in ADLs and functionality.  The pain has not responded sufficiently to  conservative care including nsaid therapy and/or physical therapy.  There is no bleeding tendency, unstable medical condition, or systemic infection. The purpose of the spinal injections is to facilitate active therapy by providing short term relief through reduction of pain and inflammation.   Injections, by themselves, are not likely to provide long-term relief. Rather, active rehabilitation with modified work achieves long-term relief by increasing active ROM, strength and stability.

## 2024-01-03 NOTE — HISTORY OF PRESENT ILLNESS
[Lower back] : lower back [6] : 6 [Dull/Aching] : dull/aching [Throbbing] : throbbing [Constant] : constant [Meds] : meds [Walking/activity] : walking/activity [Standing] : standing [2] : 2 [FreeTextEntry1] : 01/03/2024: follow up today.   Feels well on medication.  Did a lot over [place] break.    09/15/2023: follow up today for Lyrica refill.  Feeling well on Lyrica and will refill 50mg.    08/24/2022: follow up today.  Will schedule repeat TFESI.  Feels well on Lyrica.    05/03/2022: follow up today. taking Lyrica 100mg BID. She reports its helping, however she is gaining wait.  she was qsyemia previously, which she should look back into as it does contain Topamax ER.  pain in the hamstrings.   2/21/22: follow up today after right L4/5 TFESI on 1/31/22. she reports pain relief following. reports some weight gain relief the lyrica. Feels better when she works. Pain over the right lower back. she is taking 100mg BID  12/22/21- patient here for follow up. pain is returning in right leg and into right foot. Feels well on Lyrica. add 50mg daily.   10/19/21: follow up today. she reports overall she is doing well with the medication. She reports with prolonged lying down or sitting she gets pain down the right leg. Ibuprofen is helping. she forgot to take lyrica one night and felt worse.   8/30/21: follow up today. since last visit she did not start Cymbalta, instead Lyrica was called in at the patients request. She started taking 50mg BID and it helped, pain increased and she started taking it TID.   7/14/21: follow up today after right L4/5 TFESI on 6/17/21. Had 75% relief from MICHAEL. Will start Cymbalta.   6/15/21: follow up today after right L4/5 TESI on 5/20/21. Pain starts in the right buttock and radiates to the right calf and ankle. (very tearful today) she went for a follow up for her right knee (Dr. Pitts). She went to [facility] and was told it was coming from her back. Saw Dr. Levin and was told she needed surgery.   5/12/21- Patient had 60% relief from TFESI. Will repeat for cumulative effect.  4/14/21: Patient presents for initial evaluation. She complains of back and right leg pain. she started with right knee pain in January and had visco injections. she started PT and on the third session her pain started in the back and down the right leg. Pain radiates to the right lateral leg. Pain in the right calf. Intermittent n/t. tough to comfortable. +weakness.   Subjective weakness: Yes Lower extremity paresthesias: Yes Bladder/bowel dysfunction: No  Attempted modalities for current complaint: See above:  Medications: Yes NSAIDS PRN  Injections: (4/27/21, 5/20/21, 6/17/21, 1/31/22) - right L4-L5 TFESI  Previous Spine Surgery: N/A  Imaging: MRI Lumbar Spine (4/3/21) - Impression: 1. Slight exaggerated lumbar lordosis with anterolisthesis at L4-L5 where there is encroachment upon the right exiting L4 nerve root and moderate right foraminal narrowing with a small right paracentral protrusion encroaching upon the right S1 nerve root at L5-S1. 2. No acute fracture or pars defect. [] : no [FreeTextEntry6] : pressure [FreeTextEntry7] : right leg  [de-identified] : L MRI

## 2024-01-03 NOTE — PHYSICAL EXAM
[] : no palpable masses [TWNoteComboBox7] : forward flexion 75 degrees [de-identified] : extension 20 degrees

## 2024-02-06 ENCOUNTER — APPOINTMENT (OUTPATIENT)
Dept: OTOLARYNGOLOGY | Facility: CLINIC | Age: 54
End: 2024-02-06

## 2024-02-14 ENCOUNTER — TRANSCRIPTION ENCOUNTER (OUTPATIENT)
Age: 54
End: 2024-02-14

## 2024-06-21 RX ORDER — IBUPROFEN 800 MG/1
800 TABLET, FILM COATED ORAL 3 TIMES DAILY
Qty: 90 | Refills: 0 | Status: ACTIVE | COMMUNITY
Start: 2022-05-03 | End: 1900-01-01

## 2024-06-24 ENCOUNTER — RX RENEWAL (OUTPATIENT)
Age: 54
End: 2024-06-24

## 2024-06-25 ENCOUNTER — APPOINTMENT (OUTPATIENT)
Dept: INTERNAL MEDICINE | Facility: CLINIC | Age: 54
End: 2024-06-25
Payer: COMMERCIAL

## 2024-06-25 VITALS
RESPIRATION RATE: 14 BRPM | HEART RATE: 85 BPM | DIASTOLIC BLOOD PRESSURE: 82 MMHG | BODY MASS INDEX: 38.28 KG/M2 | HEIGHT: 62 IN | WEIGHT: 208 LBS | TEMPERATURE: 98.4 F | OXYGEN SATURATION: 95 % | SYSTOLIC BLOOD PRESSURE: 110 MMHG

## 2024-06-25 DIAGNOSIS — M25.561 PAIN IN RIGHT KNEE: ICD-10-CM

## 2024-06-25 DIAGNOSIS — G89.29 LOW BACK PAIN, UNSPECIFIED: ICD-10-CM

## 2024-06-25 DIAGNOSIS — Z87.898 PERSONAL HISTORY OF OTHER SPECIFIED CONDITIONS: ICD-10-CM

## 2024-06-25 DIAGNOSIS — Z87.09 PERSONAL HISTORY OF OTHER DISEASES OF THE RESPIRATORY SYSTEM: ICD-10-CM

## 2024-06-25 DIAGNOSIS — J00 ACUTE NASOPHARYNGITIS [COMMON COLD]: ICD-10-CM

## 2024-06-25 DIAGNOSIS — Z48.02 ENCOUNTER FOR REMOVAL OF SUTURES: ICD-10-CM

## 2024-06-25 DIAGNOSIS — M54.50 LOW BACK PAIN, UNSPECIFIED: ICD-10-CM

## 2024-06-25 DIAGNOSIS — R39.9 UNSPECIFIED SYMPTOMS AND SIGNS INVOLVING THE GENITOURINARY SYSTEM: ICD-10-CM

## 2024-06-25 DIAGNOSIS — R09.89 OTHER SPECIFIED SYMPTOMS AND SIGNS INVOLVING THE CIRCULATORY AND RESPIRATORY SYSTEMS: ICD-10-CM

## 2024-06-25 DIAGNOSIS — Z01.818 ENCOUNTER FOR OTHER PREPROCEDURAL EXAMINATION: ICD-10-CM

## 2024-06-25 DIAGNOSIS — Z87.39 PERSONAL HISTORY OF OTHER DISEASES OF THE MUSCULOSKELETAL SYSTEM AND CONNECTIVE TISSUE: ICD-10-CM

## 2024-06-25 DIAGNOSIS — M62.830 MUSCLE SPASM OF BACK: ICD-10-CM

## 2024-06-25 PROCEDURE — 99213 OFFICE O/P EST LOW 20 MIN: CPT

## 2024-06-25 RX ORDER — PANTOPRAZOLE 40 MG/1
40 TABLET, DELAYED RELEASE ORAL
Qty: 14 | Refills: 0 | Status: COMPLETED | COMMUNITY
Start: 2019-05-07 | End: 2024-06-25

## 2024-06-25 RX ORDER — BROMPHENIRAMINE MALEATE, PSEUDOEPHEDRINE HYDROCHLORIDE AND DEXTROMETHORPHAN HYDROBROMIDE 2; 30; 10 MG/5ML; MG/5ML; MG/5ML
30-2-10 SYRUP ORAL
Qty: 200 | Refills: 0 | Status: COMPLETED | COMMUNITY
Start: 2019-04-26 | End: 2024-06-25

## 2024-06-25 RX ORDER — TIZANIDINE HYDROCHLORIDE 4 MG/1
4 CAPSULE ORAL
Qty: 20 | Refills: 0 | Status: COMPLETED | COMMUNITY
Start: 2019-05-07 | End: 2024-06-25

## 2024-06-25 RX ORDER — FLUCONAZOLE 200 MG/1
200 TABLET ORAL
Qty: 1 | Refills: 0 | Status: COMPLETED | COMMUNITY
Start: 2019-05-07 | End: 2024-06-25

## 2024-06-25 RX ORDER — MOMETASONE 50 UG/1
50 SPRAY, METERED NASAL
Qty: 1 | Refills: 0 | Status: COMPLETED | COMMUNITY
Start: 2019-04-04 | End: 2024-06-25

## 2024-07-09 ENCOUNTER — TRANSCRIPTION ENCOUNTER (OUTPATIENT)
Age: 54
End: 2024-07-09

## 2024-07-29 ENCOUNTER — APPOINTMENT (OUTPATIENT)
Dept: PAIN MANAGEMENT | Facility: CLINIC | Age: 54
End: 2024-07-29

## 2024-08-12 ENCOUNTER — APPOINTMENT (OUTPATIENT)
Dept: PAIN MANAGEMENT | Facility: CLINIC | Age: 54
End: 2024-08-12
Payer: COMMERCIAL

## 2024-08-12 ENCOUNTER — RX RENEWAL (OUTPATIENT)
Age: 54
End: 2024-08-12

## 2024-08-12 VITALS — BODY MASS INDEX: 38.28 KG/M2 | HEIGHT: 62 IN | WEIGHT: 208 LBS

## 2024-08-12 DIAGNOSIS — M51.9 UNSPECIFIED THORACIC, THORACOLUMBAR AND LUMBOSACRAL INTERVERTEBRAL DISC DISORDER: ICD-10-CM

## 2024-08-12 DIAGNOSIS — G89.29 LOW BACK PAIN, UNSPECIFIED: ICD-10-CM

## 2024-08-12 DIAGNOSIS — M54.50 LOW BACK PAIN, UNSPECIFIED: ICD-10-CM

## 2024-08-12 PROCEDURE — 99213 OFFICE O/P EST LOW 20 MIN: CPT

## 2024-08-12 NOTE — HISTORY OF PRESENT ILLNESS
[Lower back] : lower back [6] : 6 [Dull/Aching] : dull/aching [Throbbing] : throbbing [Constant] : constant [Meds] : meds [Walking/activity] : walking/activity [Standing] : standing [4] : 4 [2] : 2 [Intermittent] : intermittent [Household chores] : household chores [Leisure] : leisure [Walking] : walking [FreeTextEntry1] : 8/12/24- fu for Lyrica refill.  Has some throbbing pain in the right leg. No side effects of Lyrica.  Can consider right L4/5 TFESI again if pain gets worse. She admits to not exercises as much as she should. Having some right knee pain.   01/03/2024: follow up today.   Feels well on medication.  Did a lot over [place] break.    09/15/2023: follow up today for Lyrica refill.  Feeling well on Lyrica and will refill 50mg.    08/24/2022: follow up today.  Will schedule repeat TFESI.  Feels well on Lyrica.    05/03/2022: follow up today. taking Lyrica 100mg BID. She reports its helping, however she is gaining wait.  she was qsyemia previously, which she should look back into as it does contain Topamax ER.  pain in the hamstrings.   2/21/22: follow up today after right L4/5 TFESI on 1/31/22. she reports pain relief following. reports some weight gain relief the lyrica. Feels better when she works. Pain over the right lower back. she is taking 100mg BID  12/22/21- patient here for follow up. pain is returning in right leg and into right foot. Feels well on Lyrica. add 50mg daily.   10/19/21: follow up today. she reports overall she is doing well with the medication. She reports with prolonged lying down or sitting she gets pain down the right leg. Ibuprofen is helping. she forgot to take lyrica one night and felt worse.   8/30/21: follow up today. since last visit she did not start Cymbalta, instead Lyrica was called in at the patients request. She started taking 50mg BID and it helped, pain increased and she started taking it TID.   7/14/21: follow up today after right L4/5 TFESI on 6/17/21. Had 75% relief from MICHAEL. Will start Cymbalta.   6/15/21: follow up today after right L4/5 TESI on 5/20/21. Pain starts in the right buttock and radiates to the right calf and ankle. (very tearful today) she went for a follow up for her right knee (Dr. Pitts). She went to [facility] and was told it was coming from her back. Saw Dr. Levin and was told she needed surgery.   5/12/21- Patient had 60% relief from TFESI. Will repeat for cumulative effect.  4/14/21: Patient presents for initial evaluation. She complains of back and right leg pain. she started with right knee pain in January and had visco injections. she started PT and on the third session her pain started in the back and down the right leg. Pain radiates to the right lateral leg. Pain in the right calf. Intermittent n/t. tough to comfortable. +weakness.   Subjective weakness: Yes Lower extremity paresthesias: Yes Bladder/bowel dysfunction: No  Attempted modalities for current complaint: See above:  Medications: Yes NSAIDS PRN  Injections: (4/27/21, 5/20/21, 6/17/21, 1/31/22) - right L4-L5 TFESI  Previous Spine Surgery: N/A  Imaging: MRI Lumbar Spine (4/3/21) - Impression: 1. Slight exaggerated lumbar lordosis with anterolisthesis at L4-L5 where there is encroachment upon the right exiting L4 nerve root and moderate right foraminal narrowing with a small right paracentral protrusion encroaching upon the right S1 nerve root at L5-S1. 2. No acute fracture or pars defect. [] : no [FreeTextEntry6] : pressure [FreeTextEntry7] : right leg  [de-identified] : L MRI

## 2024-08-12 NOTE — ASSESSMENT
[FreeTextEntry1] : After discussing various treatment options with the patient including but not limited to oral medications, physical therapy, exercise, modalities as well as interventional spinal injections, we have decided with the following plan:  1) Intervention Injection Therapy: I personally reviewed the MRI/CT scan images and agree with the radiologist's report. The radiological findings were discussed with the patient. The risks, benefits, contents and alternatives to injection were explained in full to the patient. Risks outlined include but are not limited to infection,sepsis, bleeding, post-dural puncture headache, nerve damage, temporary increase in pain, syncopal episode, failure to resolve symptoms, allergic reaction, symptom recurrence, and elevation of blood sugar in diabetics. Cortisone may cause immunosuppression. Patient understands the risks. All questions were answered. After discussion of options, patient requested an injection. Information regarding the injection was given to the patient. Which medications to stop prior to the injection was explained to the patient as well.  Follow up in 1-2 weeks post injection for re-evaluation.  Continue Home exercises, stretching, activity modification, physical therapy, and conservative care.  Patient is presenting with acute/sub-acute radicular pain with impairment in ADLs and functionality.  The pain has not responded sufficiently to  conservative care including nsaid therapy and/or physical therapy.  There is no bleeding tendency, unstable medical condition, or systemic infection. The purpose of the spinal injections is to facilitate active therapy by providing short term relief through reduction of pain and inflammation.   Injections, by themselves, are not likely to provide long-term relief. Rather, active rehabilitation with modified work achieves long-term relief by increasing active ROM, strength and stability.  right L4/5 TFESI will call   2) Patient is stable on current regimen of medication. Denies any side of effects. Risks and benefits discussed. Functional improvement noted. At least >30% improvement of pain. Will renew patient medication.  Lyrica

## 2024-08-12 NOTE — HISTORY OF PRESENT ILLNESS
[Lower back] : lower back [6] : 6 [Dull/Aching] : dull/aching [Throbbing] : throbbing [Constant] : constant [Meds] : meds [Walking/activity] : walking/activity [Standing] : standing [4] : 4 [2] : 2 [Intermittent] : intermittent [Household chores] : household chores [Leisure] : leisure [Walking] : walking [FreeTextEntry1] : 8/12/24- fu for Lyrica refill.  Has some throbbing pain in the right leg. No side effects of Lyrica.  Can consider right L4/5 TFESI again if pain gets worse. She admits to not exercises as much as she should. Having some right knee pain.   01/03/2024: follow up today.   Feels well on medication.  Did a lot over [place] break.    09/15/2023: follow up today for Lyrica refill.  Feeling well on Lyrica and will refill 50mg.    08/24/2022: follow up today.  Will schedule repeat TFESI.  Feels well on Lyrica.    05/03/2022: follow up today. taking Lyrica 100mg BID. She reports its helping, however she is gaining wait.  she was qsyemia previously, which she should look back into as it does contain Topamax ER.  pain in the hamstrings.   2/21/22: follow up today after right L4/5 TFESI on 1/31/22. she reports pain relief following. reports some weight gain relief the lyrica. Feels better when she works. Pain over the right lower back. she is taking 100mg BID  12/22/21- patient here for follow up. pain is returning in right leg and into right foot. Feels well on Lyrica. add 50mg daily.   10/19/21: follow up today. she reports overall she is doing well with the medication. She reports with prolonged lying down or sitting she gets pain down the right leg. Ibuprofen is helping. she forgot to take lyrica one night and felt worse.   8/30/21: follow up today. since last visit she did not start Cymbalta, instead Lyrica was called in at the patients request. She started taking 50mg BID and it helped, pain increased and she started taking it TID.   7/14/21: follow up today after right L4/5 TFESI on 6/17/21. Had 75% relief from MICHAEL. Will start Cymbalta.   6/15/21: follow up today after right L4/5 TESI on 5/20/21. Pain starts in the right buttock and radiates to the right calf and ankle. (very tearful today) she went for a follow up for her right knee (Dr. Pitts). She went to [facility] and was told it was coming from her back. Saw Dr. Levin and was told she needed surgery.   5/12/21- Patient had 60% relief from TFESI. Will repeat for cumulative effect.  4/14/21: Patient presents for initial evaluation. She complains of back and right leg pain. she started with right knee pain in January and had visco injections. she started PT and on the third session her pain started in the back and down the right leg. Pain radiates to the right lateral leg. Pain in the right calf. Intermittent n/t. tough to comfortable. +weakness.   Subjective weakness: Yes Lower extremity paresthesias: Yes Bladder/bowel dysfunction: No  Attempted modalities for current complaint: See above:  Medications: Yes NSAIDS PRN  Injections: (4/27/21, 5/20/21, 6/17/21, 1/31/22) - right L4-L5 TFESI  Previous Spine Surgery: N/A  Imaging: MRI Lumbar Spine (4/3/21) - Impression: 1. Slight exaggerated lumbar lordosis with anterolisthesis at L4-L5 where there is encroachment upon the right exiting L4 nerve root and moderate right foraminal narrowing with a small right paracentral protrusion encroaching upon the right S1 nerve root at L5-S1. 2. No acute fracture or pars defect. [] : no [FreeTextEntry6] : pressure [FreeTextEntry7] : right leg  [de-identified] : L MRI

## 2024-08-12 NOTE — PHYSICAL EXAM
[] : no palpable masses [TWNoteComboBox7] : forward flexion 75 degrees [de-identified] : extension 20 degrees

## 2024-08-12 NOTE — PHYSICAL EXAM
[] : no palpable masses [TWNoteComboBox7] : forward flexion 75 degrees [de-identified] : extension 20 degrees

## 2024-09-25 ENCOUNTER — APPOINTMENT (OUTPATIENT)
Dept: PAIN MANAGEMENT | Facility: CLINIC | Age: 54
End: 2024-09-25
Payer: COMMERCIAL

## 2024-09-25 DIAGNOSIS — M51.9 UNSPECIFIED THORACIC, THORACOLUMBAR AND LUMBOSACRAL INTERVERTEBRAL DISC DISORDER: ICD-10-CM

## 2024-09-25 PROCEDURE — 99213 OFFICE O/P EST LOW 20 MIN: CPT

## 2024-09-25 NOTE — PHYSICAL EXAM
[] : motor exam is non-focal throughout both lower extremities [TWNoteComboBox7] : forward flexion 75 degrees [de-identified] : extension 20 degrees

## 2024-09-25 NOTE — HISTORY OF PRESENT ILLNESS
[FreeTextEntry1] : 09/25/2024: follow up today for low back pain.  Would like to renew meds.  No pain  8/12/24- fu for Lyrica refill.  Has some throbbing pain in the right leg. No side effects of Lyrica.  Can consider right L4/5 TFESI again if pain gets worse. She admits to not exercises as much as she should. Having some right knee pain.   01/03/2024: follow up today.   Feels well on medication.  Did a lot over [place] break.    09/15/2023: follow up today for Lyrica refill.  Feeling well on Lyrica and will refill 50mg.    08/24/2022: follow up today.  Will schedule repeat TFESI.  Feels well on Lyrica.    05/03/2022: follow up today. taking Lyrica 100mg BID. She reports its helping, however she is gaining wait.  she was qsyemia previously, which she should look back into as it does contain Topamax ER.  pain in the hamstrings.   2/21/22: follow up today after right L4/5 TFESI on 1/31/22. she reports pain relief following. reports some weight gain relief the lyrica. Feels better when she works. Pain over the right lower back. she is taking 100mg BID  12/22/21- patient here for follow up. pain is returning in right leg and into right foot. Feels well on Lyrica. add 50mg daily.   10/19/21: follow up today. she reports overall she is doing well with the medication. She reports with prolonged lying down or sitting she gets pain down the right leg. Ibuprofen is helping. she forgot to take lyrica one night and felt worse.   8/30/21: follow up today. since last visit she did not start Cymbalta, instead Lyrica was called in at the patients request. She started taking 50mg BID and it helped, pain increased and she started taking it TID.   7/14/21: follow up today after right L4/5 TFESI on 6/17/21. Had 75% relief from MICHAEL. Will start Cymbalta.   6/15/21: follow up today after right L4/5 TESI on 5/20/21. Pain starts in the right buttock and radiates to the right calf and ankle. (very tearful today) she went for a follow up for her right knee (Dr. Pitts). She went to [facility] and was told it was coming from her back. Saw Dr. Levin and was told she needed surgery.   5/12/21- Patient had 60% relief from TFESI. Will repeat for cumulative effect.  4/14/21: Patient presents for initial evaluation. She complains of back and right leg pain. she started with right knee pain in January and had visco injections. she started PT and on the third session her pain started in the back and down the right leg. Pain radiates to the right lateral leg. Pain in the right calf. Intermittent n/t. tough to comfortable. +weakness.   Subjective weakness: Yes Lower extremity paresthesias: Yes Bladder/bowel dysfunction: No  Attempted modalities for current complaint: See above:  Medications: Yes NSAIDS PRN  Injections: (4/27/21, 5/20/21, 6/17/21, 1/31/22) - right L4-L5 TFESI  Previous Spine Surgery: N/A  Imaging: MRI Lumbar Spine (4/3/21) - Impression: 1. Slight exaggerated lumbar lordosis with anterolisthesis at L4-L5 where there is encroachment upon the right exiting L4 nerve root and moderate right foraminal narrowing with a small right paracentral protrusion encroaching upon the right S1 nerve root at L5-S1. 2. No acute fracture or pars defect. [Lower back] : lower back [4] : 4 [2] : 2 [Dull/Aching] : dull/aching [Throbbing] : throbbing [Intermittent] : intermittent [Household chores] : household chores [Leisure] : leisure [Meds] : meds [Walking/activity] : walking/activity [Standing] : standing [Walking] : walking [] : no [FreeTextEntry6] : pressure [FreeTextEntry7] : right leg  [de-identified] : L MRI

## 2024-10-21 ENCOUNTER — APPOINTMENT (OUTPATIENT)
Dept: PAIN MANAGEMENT | Facility: CLINIC | Age: 54
End: 2024-10-21

## 2024-10-25 ENCOUNTER — APPOINTMENT (OUTPATIENT)
Dept: INTERNAL MEDICINE | Facility: CLINIC | Age: 54
End: 2024-10-25
Payer: COMMERCIAL

## 2024-10-25 VITALS
OXYGEN SATURATION: 98 % | DIASTOLIC BLOOD PRESSURE: 80 MMHG | SYSTOLIC BLOOD PRESSURE: 132 MMHG | RESPIRATION RATE: 14 BRPM | WEIGHT: 205 LBS | HEART RATE: 74 BPM | BODY MASS INDEX: 37.73 KG/M2 | HEIGHT: 62 IN | TEMPERATURE: 98.1 F

## 2024-10-25 DIAGNOSIS — R63.4 ABNORMAL WEIGHT LOSS: ICD-10-CM

## 2024-10-25 DIAGNOSIS — R10.12 LEFT UPPER QUADRANT PAIN: ICD-10-CM

## 2024-10-25 DIAGNOSIS — Z80.0 FAMILY HISTORY OF MALIGNANT NEOPLASM OF DIGESTIVE ORGANS: ICD-10-CM

## 2024-10-25 DIAGNOSIS — F17.200 NICOTINE DEPENDENCE, UNSPECIFIED, UNCOMPLICATED: ICD-10-CM

## 2024-10-25 PROCEDURE — 99214 OFFICE O/P EST MOD 30 MIN: CPT

## 2024-10-25 RX ORDER — NICOTINE POLACRILEX 2 MG/1
2 GUM, CHEWING ORAL
Qty: 1 | Refills: 2 | Status: ACTIVE | COMMUNITY
Start: 2024-10-25 | End: 1900-01-01

## 2024-11-07 ENCOUNTER — APPOINTMENT (OUTPATIENT)
Dept: CT IMAGING | Facility: CLINIC | Age: 54
End: 2024-11-07
Payer: COMMERCIAL

## 2024-11-07 PROCEDURE — 74160 CT ABDOMEN W/CONTRAST: CPT

## 2024-11-18 ENCOUNTER — APPOINTMENT (OUTPATIENT)
Dept: PAIN MANAGEMENT | Facility: CLINIC | Age: 54
End: 2024-11-18

## 2024-12-16 ENCOUNTER — NON-APPOINTMENT (OUTPATIENT)
Age: 54
End: 2024-12-16

## 2024-12-16 ENCOUNTER — APPOINTMENT (OUTPATIENT)
Dept: INTERNAL MEDICINE | Facility: CLINIC | Age: 54
End: 2024-12-16
Payer: COMMERCIAL

## 2024-12-16 VITALS
BODY MASS INDEX: 37.36 KG/M2 | RESPIRATION RATE: 16 BRPM | DIASTOLIC BLOOD PRESSURE: 92 MMHG | HEIGHT: 62 IN | HEART RATE: 76 BPM | TEMPERATURE: 97.5 F | OXYGEN SATURATION: 99 % | WEIGHT: 203 LBS | SYSTOLIC BLOOD PRESSURE: 136 MMHG

## 2024-12-16 DIAGNOSIS — Z13.9 ENCOUNTER FOR SCREENING, UNSPECIFIED: ICD-10-CM

## 2024-12-16 PROCEDURE — 99213 OFFICE O/P EST LOW 20 MIN: CPT

## 2024-12-16 PROCEDURE — 93000 ELECTROCARDIOGRAM COMPLETE: CPT

## 2024-12-18 ENCOUNTER — APPOINTMENT (OUTPATIENT)
Dept: INTERNAL MEDICINE | Facility: CLINIC | Age: 54
End: 2024-12-18

## 2024-12-31 ENCOUNTER — APPOINTMENT (OUTPATIENT)
Dept: PAIN MANAGEMENT | Facility: CLINIC | Age: 54
End: 2024-12-31
Payer: COMMERCIAL

## 2024-12-31 ENCOUNTER — APPOINTMENT (OUTPATIENT)
Dept: PAIN MANAGEMENT | Facility: CLINIC | Age: 54
End: 2024-12-31

## 2024-12-31 VITALS — BODY MASS INDEX: 37.73 KG/M2 | HEIGHT: 62 IN | WEIGHT: 205 LBS

## 2024-12-31 DIAGNOSIS — M51.9 UNSPECIFIED THORACIC, THORACOLUMBAR AND LUMBOSACRAL INTERVERTEBRAL DISC DISORDER: ICD-10-CM

## 2024-12-31 PROCEDURE — 99213 OFFICE O/P EST LOW 20 MIN: CPT

## 2025-01-06 ENCOUNTER — RX RENEWAL (OUTPATIENT)
Age: 55
End: 2025-01-06

## 2025-04-14 ENCOUNTER — APPOINTMENT (OUTPATIENT)
Dept: PAIN MANAGEMENT | Facility: CLINIC | Age: 55
End: 2025-04-14

## 2025-05-01 ENCOUNTER — APPOINTMENT (OUTPATIENT)
Dept: INTERNAL MEDICINE | Facility: CLINIC | Age: 55
End: 2025-05-01
Payer: COMMERCIAL

## 2025-05-01 VITALS
TEMPERATURE: 98.7 F | WEIGHT: 198 LBS | SYSTOLIC BLOOD PRESSURE: 118 MMHG | OXYGEN SATURATION: 98 % | HEART RATE: 74 BPM | BODY MASS INDEX: 36.44 KG/M2 | HEIGHT: 62 IN | DIASTOLIC BLOOD PRESSURE: 88 MMHG

## 2025-05-01 DIAGNOSIS — J00 ACUTE NASOPHARYNGITIS [COMMON COLD]: ICD-10-CM

## 2025-05-01 DIAGNOSIS — H66.90 OTITIS MEDIA, UNSPECIFIED, UNSPECIFIED EAR: ICD-10-CM

## 2025-05-01 DIAGNOSIS — R09.82 POSTNASAL DRIP: ICD-10-CM

## 2025-05-01 LAB
FLUAV SPEC QL CULT: NEGATIVE
FLUBV AG SPEC QL IA: NEGATIVE
SARS-COV-2 RDRP RESP QL NAA+PROBE: NEGATIVE

## 2025-05-01 PROCEDURE — 99214 OFFICE O/P EST MOD 30 MIN: CPT

## 2025-05-01 PROCEDURE — 87804 INFLUENZA ASSAY W/OPTIC: CPT | Mod: QW

## 2025-05-01 RX ORDER — LEVOCETIRIZINE DIHYDROCHLORIDE 5 MG/1
5 TABLET ORAL
Qty: 10 | Refills: 0 | Status: ACTIVE | COMMUNITY
Start: 2025-05-01 | End: 1900-01-01

## 2025-05-01 RX ORDER — CEFDINIR 300 MG/1
300 CAPSULE ORAL
Qty: 14 | Refills: 0 | Status: ACTIVE | COMMUNITY
Start: 2025-05-01 | End: 1900-01-01

## 2025-05-01 RX ORDER — PREDNISONE 20 MG/1
20 TABLET ORAL
Qty: 5 | Refills: 0 | Status: ACTIVE | COMMUNITY
Start: 2025-05-01 | End: 1900-01-01

## 2025-05-12 ENCOUNTER — APPOINTMENT (OUTPATIENT)
Dept: PAIN MANAGEMENT | Facility: CLINIC | Age: 55
End: 2025-05-12
Payer: COMMERCIAL

## 2025-05-12 VITALS — BODY MASS INDEX: 36.07 KG/M2 | HEIGHT: 62 IN | WEIGHT: 196 LBS

## 2025-05-12 DIAGNOSIS — M51.9 UNSPECIFIED THORACIC, THORACOLUMBAR AND LUMBOSACRAL INTERVERTEBRAL DISC DISORDER: ICD-10-CM

## 2025-05-12 PROCEDURE — 99213 OFFICE O/P EST LOW 20 MIN: CPT

## 2025-09-04 ENCOUNTER — APPOINTMENT (OUTPATIENT)
Dept: INTERNAL MEDICINE | Facility: CLINIC | Age: 55
End: 2025-09-04
Payer: COMMERCIAL

## 2025-09-04 VITALS
HEIGHT: 62 IN | SYSTOLIC BLOOD PRESSURE: 120 MMHG | TEMPERATURE: 98.4 F | WEIGHT: 194 LBS | DIASTOLIC BLOOD PRESSURE: 78 MMHG | BODY MASS INDEX: 35.7 KG/M2 | RESPIRATION RATE: 14 BRPM | HEART RATE: 84 BPM | OXYGEN SATURATION: 98 %

## 2025-09-04 DIAGNOSIS — M79.601 PAIN IN RIGHT ARM: ICD-10-CM

## 2025-09-04 PROCEDURE — 99213 OFFICE O/P EST LOW 20 MIN: CPT

## 2025-09-04 RX ORDER — METHYLPREDNISOLONE 4 MG/1
4 TABLET ORAL
Qty: 1 | Refills: 1 | Status: ACTIVE | COMMUNITY
Start: 2025-09-04 | End: 1900-01-01

## 2025-09-08 ENCOUNTER — APPOINTMENT (OUTPATIENT)
Dept: INTERNAL MEDICINE | Facility: CLINIC | Age: 55
End: 2025-09-08
Payer: COMMERCIAL

## 2025-09-08 VITALS
TEMPERATURE: 98.4 F | HEART RATE: 78 BPM | RESPIRATION RATE: 16 BRPM | OXYGEN SATURATION: 99 % | DIASTOLIC BLOOD PRESSURE: 82 MMHG | SYSTOLIC BLOOD PRESSURE: 128 MMHG

## 2025-09-08 DIAGNOSIS — R05.9 COUGH, UNSPECIFIED: ICD-10-CM

## 2025-09-08 DIAGNOSIS — H66.90 OTITIS MEDIA, UNSPECIFIED, UNSPECIFIED EAR: ICD-10-CM

## 2025-09-08 PROCEDURE — 99214 OFFICE O/P EST MOD 30 MIN: CPT

## 2025-09-08 RX ORDER — CEFDINIR 300 MG/1
300 CAPSULE ORAL
Qty: 20 | Refills: 0 | Status: ACTIVE | COMMUNITY
Start: 2025-09-08 | End: 1900-01-01

## 2025-09-08 RX ORDER — PROMETHAZINE HYDROCHLORIDE AND DEXTROMETHORPHAN HYDROBROMIDE ORAL SOLUTION 15; 6.25 MG/5ML; MG/5ML
6.25-15 SOLUTION ORAL
Qty: 120 | Refills: 0 | Status: ACTIVE | COMMUNITY
Start: 2025-09-08 | End: 1900-01-01

## 2025-09-15 ENCOUNTER — APPOINTMENT (OUTPATIENT)
Dept: INTERNAL MEDICINE | Facility: CLINIC | Age: 55
End: 2025-09-15